# Patient Record
Sex: MALE
[De-identification: names, ages, dates, MRNs, and addresses within clinical notes are randomized per-mention and may not be internally consistent; named-entity substitution may affect disease eponyms.]

---

## 2024-04-18 PROBLEM — Z00.00 ENCOUNTER FOR PREVENTIVE HEALTH EXAMINATION: Status: ACTIVE | Noted: 2024-04-18

## 2024-04-22 ENCOUNTER — APPOINTMENT (OUTPATIENT)
Dept: ORTHOPEDIC SURGERY | Facility: CLINIC | Age: 24
End: 2024-04-22

## 2024-08-19 ENCOUNTER — APPOINTMENT (OUTPATIENT)
Dept: ORTHOPEDIC SURGERY | Facility: CLINIC | Age: 24
End: 2024-08-19
Payer: COMMERCIAL

## 2024-08-19 VITALS — WEIGHT: 160 LBS | BODY MASS INDEX: 23.7 KG/M2 | HEIGHT: 69 IN

## 2024-08-19 DIAGNOSIS — M25.632 STIFFNESS OF LEFT WRIST, NOT ELSEWHERE CLASSIFIED: ICD-10-CM

## 2024-08-19 DIAGNOSIS — Z78.9 OTHER SPECIFIED HEALTH STATUS: ICD-10-CM

## 2024-08-19 DIAGNOSIS — M25.639 STIFFNESS OF UNSPECIFIED WRIST, NOT ELSEWHERE CLASSIFIED: ICD-10-CM

## 2024-08-19 PROCEDURE — 73090 X-RAY EXAM OF FOREARM: CPT | Mod: LT

## 2024-08-19 PROCEDURE — 73080 X-RAY EXAM OF ELBOW: CPT | Mod: LT

## 2024-08-19 PROCEDURE — 73110 X-RAY EXAM OF WRIST: CPT | Mod: LT

## 2024-08-19 NOTE — HISTORY OF PRESENT ILLNESS
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 No prior treatment, patient states no full of motion  [FreeTextEntry8] : NA

## 2024-08-19 NOTE — DISCUSSION/SUMMARY
[de-identified] : Discussed the nature of the diagnosis and risk and benefits of different modalities of treatment. Xrays reviewed and discussed Discussion of etiology is unclear Will discuss with specalist and call PT

## 2024-08-19 NOTE — PHYSICAL EXAM
[FreeTextEntry9] : full flexion extension [de-identified] : No DRUJ [FreeTextEntry1] : Slight hypoplasia of radial head  [TWNoteComboBox6] : pronation 70 degrees [de-identified] : supination 0 degrees [] : no erythema [Left] : left wrist [FreeTextEntry3] : tendncy of [place] neck  [FreeTextEntry8] : Ulnar (+) variance